# Patient Record
Sex: FEMALE | Race: WHITE | Employment: FULL TIME | URBAN - METROPOLITAN AREA
[De-identification: names, ages, dates, MRNs, and addresses within clinical notes are randomized per-mention and may not be internally consistent; named-entity substitution may affect disease eponyms.]

---

## 2022-10-12 ENCOUNTER — OFFICE VISIT (OUTPATIENT)
Dept: CARDIOLOGY CLINIC | Age: 52
End: 2022-10-12
Payer: COMMERCIAL

## 2022-10-12 VITALS
SYSTOLIC BLOOD PRESSURE: 130 MMHG | HEART RATE: 71 BPM | HEIGHT: 63 IN | DIASTOLIC BLOOD PRESSURE: 70 MMHG | OXYGEN SATURATION: 98 % | WEIGHT: 159.9 LBS | BODY MASS INDEX: 28.33 KG/M2

## 2022-10-12 DIAGNOSIS — E78.5 HYPERLIPIDEMIA, UNSPECIFIED HYPERLIPIDEMIA TYPE: ICD-10-CM

## 2022-10-12 DIAGNOSIS — Z82.49 FAMILY HISTORY OF HEART DISEASE: ICD-10-CM

## 2022-10-12 DIAGNOSIS — Z72.0 TOBACCO ABUSE: ICD-10-CM

## 2022-10-12 DIAGNOSIS — I63.9 CEREBROVASCULAR ACCIDENT (CVA), UNSPECIFIED MECHANISM (HCC): Primary | ICD-10-CM

## 2022-10-12 PROCEDURE — 99203 OFFICE O/P NEW LOW 30 MIN: CPT | Performed by: INTERNAL MEDICINE

## 2022-10-12 PROCEDURE — 93000 ELECTROCARDIOGRAM COMPLETE: CPT | Performed by: INTERNAL MEDICINE

## 2022-10-12 RX ORDER — ATORVASTATIN CALCIUM 80 MG/1
TABLET, FILM COATED ORAL
COMMUNITY
Start: 2022-09-29 | End: 2022-10-12

## 2022-10-12 RX ORDER — ASPIRIN 81 MG/1
81 TABLET, CHEWABLE ORAL DAILY
COMMUNITY
Start: 2022-09-30

## 2022-10-12 RX ORDER — ROSUVASTATIN CALCIUM 40 MG/1
40 TABLET, COATED ORAL NIGHTLY
COMMUNITY
Start: 2022-10-07

## 2022-10-12 RX ORDER — CLOPIDOGREL BISULFATE 75 MG/1
75 TABLET ORAL DAILY
COMMUNITY
Start: 2022-09-29 | End: 2022-10-20

## 2022-10-12 NOTE — PROGRESS NOTES
[facility]   Cardiac Consultation    Referring Provider:  Kyle Huitron     Chief Complaint   Patient presents with    New Patient    Hyperlipidemia    Follow-Up from Hospital    Had acute stroke    History of Present Illness:   Rosalie Flores is a 46 y.o. female with history of hyperlipidemia and smoker. Rosalie Flores recently was diagnosed with CVA (Care Everywhere). She initially presented with right upper extremity weakness and difficulty speaking. She had CTA as well as an MRI which demonstrated left basal ganglia infarct. Was evaluated by neurology and vascular surgery. Started on aspirin and Plavix and high-dose statin. She had resolution of symptoms. Today, Rosalie Flores is here as a new patient due to ischemic CVA. She is trying to quit smoking, she only had 2 cigarettes yesterday. She changed her diet, she cut out fried food and red meat. Pt denies exertional chest pain, LEES/PND, palpitations, light-headedness, edema. Past Medical History:   has a past medical history of Hyperlipidemia. Surgical History:   has no past surgical history on file. Social History:   reports that she has been smoking cigarettes. She has been smoking an average of .25 packs per day. She has never used smokeless tobacco. She reports that she does not drink alcohol and does not use drugs. Family History:  family history includes Breast Cancer in her mother; Heart Surgery in her father; High Cholesterol in her father; Hypertension in her father. Home Medications:  Prior to Admission medications    Medication Sig Start Date End Date Taking?  Authorizing Provider   clopidogrel (PLAVIX) 75 MG tablet Take 75 mg by mouth daily 9/29/22 10/20/22 Yes Historical Provider, MD   aspirin 81 MG chewable tablet Take 81 mg by mouth daily 9/30/22  Yes Historical Provider, MD   rosuvastatin (CRESTOR) 40 MG tablet Take 40 mg by mouth nightly 10/7/22  Yes Historical Provider, MD        Allergies:  Penicillins     Review of Systems:   Constitutional: there has been no unanticipated weight loss. There's been no change in energy level, sleep pattern, or activity level. Eyes: No visual changes or diplopia. No scleral icterus. ENT: No Headaches, hearing loss or vertigo. No mouth sores or sore throat. Cardiovascular: Reviewed in HPI  Respiratory: No cough or wheezing, no sputum production. No hematemesis. Gastrointestinal: No abdominal pain, appetite loss, blood in stools. No change in bowel or bladder habits. Genitourinary: No dysuria, trouble voiding, or hematuria. Musculoskeletal:  No gait disturbance, weakness or joint complaints. Integumentary: No rash or pruritis. Neurological: No headache, diplopia, change in muscle strength, numbness or tingling. No change in gait, balance, coordination, mood, affect, memory, mentation, behavior. Psychiatric: No anxiety, no depression. Endocrine: No malaise, fatigue or temperature intolerance. No excessive thirst, fluid intake, or urination. No tremor. Hematologic/Lymphatic: No abnormal bruising or bleeding, blood clots or swollen lymph nodes. Allergic/Immunologic: No nasal congestion or hives. Physical Examination:    Vitals:    10/12/22 1532   BP: 130/70   Pulse: 71   SpO2: 98%        Wt Readings from Last 1 Encounters:   10/12/22 159 lb 14.4 oz (72.5 kg)       Constitutional and General Appearance: NAD   Skin:good turgor,intact without lesions  HEENT: EOMI ,normal  Neck:no JVD     Respiratory:  Normal excursion and expansion without use of accessory muscles  Resp Auscultation: Normal breath sounds without dullness  Cardiovascular: The apical impulses not displaced  Heart tones are crisp and normal  Cervical veins are not engorged  The carotid upstroke is normal in amplitude and contour without delay or bruit  Peripheral pulses are symmetrical and full  There is no clubbing, cyanosis of the extremities.   No edema  Femoral Arteries: 2+ and equal  Pedal Pulses: 2+ and equal Abdomen:  No masses or tenderness  Liver/Spleen: No Abnormalities Noted  Neurological/Psychiatric:  Alert and oriented in all spheres  Moves all extremities well  Exhibits normal gait balance and coordination  No abnormalities of mood, affect, memory, mentation, or behavior are noted    Echo 9/28/22 (Care Everywhere)  EF ~55-60%    Carotid duplex 9/28/22 (Care Everywhere)  Minor plaque in right and left carotid bulbs and internal carotid arteries, corresponding to a less than 50% stenosis. Assessment  Ischemic CVA  Continue ASA/plavix/statin    Hyperlipidemia  Continue Crestor 40 mg  9/29/22 >   HDL 33   Recommend low cholesterol / low fat diet    Tobacco abuse  Encouraged smoking cessation    Family history of heart disease    Plan:    Cardiac test and lab results personally reviewed by me during this office visit and discussed. Recheck lipids in November, consider additional cholesterol medications if needed  Cardiac CT calcium score - [facility] [phone]  Recommend low cholesterol / low fat diet  Continue risk factor modifications. Call for any change in symptoms, call to report any changes in shortness of breath or development of chest pain with activity. Follow up in 4 mos          I appreciate the opportunity of cooperating in the care of this individual.    Lisa Marquez M.D., [facility] [place]    Patient's problem list, medications, allergies, past medical, surgical, social and family histories were reviewed and updated as appropriate. Scribe's attestation: This note was scribed in the presence of Dr Veto Marquez by Rosalinda Blizzard, RN. The scribe's documentation has been prepared under my direction and personally reviewed by me in its entirety. I confirm that the note above accurately reflects all work, treatment, procedures, and medical decision making performed by me.

## 2022-10-12 NOTE — PATIENT INSTRUCTIONS
Recheck lipids in November  Cardiac CT calcium score -  At [facility] AT [facility] [phone]  Recommend low cholesterol / low fat diet  Continue risk factor modifications. Call for any change in symptoms, call to report any changes in shortness of breath or development of chest pain with activity.     Follow up in 4 mos

## 2022-10-21 ENCOUNTER — TELEPHONE (OUTPATIENT)
Dept: CARDIOLOGY CLINIC | Age: 52
End: 2022-10-21

## 2022-10-21 NOTE — TELEPHONE ENCOUNTER
Spoke to Vilma. Pt aware of result of \"zero\" on CT calcium score. Pt would like to speak to LES regarding other findings, including 5 mm nodule in right lower lobe.

## 2022-10-24 NOTE — TELEPHONE ENCOUNTER
Let her know that nodules are so small then very unlikely to progress so no further testing needed.  Still important to work on smoking cessation

## 2023-01-03 NOTE — PROGRESS NOTES
[facility], [place], operated by [facility]   Cardiac f/up    Referring Provider:  Marisa Escalante     Chief Complaint   Patient presents with    Follow-up     No c/c      Had acute stroke    History of Present Illness:   Kate Perkins is a 45 y.o. female with history of hyperlipidemia and smoker. Kate Perkins recently was diagnosed with CVA (Care Everywhere). She initially presented with right upper extremity weakness and difficulty speaking. She had CTA as well as an MRI which demonstrated left basal ganglia infarct. Was evaluated by neurology and vascular surgery. Started on aspirin and Plavix and high-dose statin. She had resolution of symptoms. Today, Kate Perkins is here for 4 mos follow up. She is feeling well and doing better with her diet. She is trying to quit smoking, she is only smoking a couple cigarettes per day. Pt denies exertional chest pain, LEES/PND, palpitations, light-headedness, edema. Past Medical History:   has a past medical history of Hyperlipidemia. Surgical History:   has no past surgical history on file. Social History:   reports that she has been smoking cigarettes. She has been smoking an average of .25 packs per day. She has never used smokeless tobacco. She reports that she does not drink alcohol and does not use drugs. Family History:  family history includes Breast Cancer in her mother; Heart Surgery in her father; High Cholesterol in her father; Hypertension in her father. Home Medications:  Prior to Admission medications    Medication Sig Start Date End Date Taking?  Authorizing Provider   rosuvastatin (CRESTOR) 20 MG tablet Take 1 tablet by mouth daily 1/26/23  Yes Ronald Mo MD   aspirin 81 MG chewable tablet Take 81 mg by mouth daily 9/30/22  Yes Historical Provider, MD   clopidogrel (PLAVIX) 75 MG tablet Take 75 mg by mouth daily  Patient not taking: Reported on 1/26/2023 9/29/22 10/20/22  Historical Provider, MD        Allergies:  Penicillins     Review of Systems: Constitutional: there has been no unanticipated weight loss. There's been no change in energy level, sleep pattern, or activity level. Eyes: No visual changes or diplopia. No scleral icterus. ENT: No Headaches, hearing loss or vertigo. No mouth sores or sore throat. Cardiovascular: Reviewed in HPI  Respiratory: No cough or wheezing, no sputum production. No hematemesis. Gastrointestinal: No abdominal pain, appetite loss, blood in stools. No change in bowel or bladder habits. Genitourinary: No dysuria, trouble voiding, or hematuria. Musculoskeletal:  No gait disturbance, weakness or joint complaints. Integumentary: No rash or pruritis. Neurological: No headache, diplopia, change in muscle strength, numbness or tingling. No change in gait, balance, coordination, mood, affect, memory, mentation, behavior. Psychiatric: No anxiety, no depression. Endocrine: No malaise, fatigue or temperature intolerance. No excessive thirst, fluid intake, or urination. No tremor. Hematologic/Lymphatic: No abnormal bruising or bleeding, blood clots or swollen lymph nodes. Allergic/Immunologic: No nasal congestion or hives. Physical Examination:    Vitals:    01/26/23 0807   BP: 112/70   Pulse: 75   SpO2: 98%          Wt Readings from Last 1 Encounters:   01/26/23 158 lb (71.7 kg)       Constitutional and General Appearance: NAD   Skin:good turgor,intact without lesions  HEENT: EOMI ,normal  Neck:no JVD     Respiratory:  Normal excursion and expansion without use of accessory muscles  Resp Auscultation: Normal breath sounds without dullness  Cardiovascular: The apical impulses not displaced  Heart tones are crisp and normal  Cervical veins are not engorged  The carotid upstroke is normal in amplitude and contour without delay or bruit  Peripheral pulses are symmetrical and full  There is no clubbing, cyanosis of the extremities.   No edema  Femoral Arteries: 2+ and equal  Pedal Pulses: 2+ and equal   Abdomen:  No masses or tenderness  Liver/Spleen: No Abnormalities Noted  Neurological/Psychiatric:  Alert and oriented in all spheres  Moves all extremities well  Exhibits normal gait balance and coordination  No abnormalities of mood, affect, memory, mentation, or behavior are noted    Echo 9/28/22 (Care Everywhere)  EF ~55-60%    Carotid duplex 9/28/22 (Care Everywhere)  Minor plaque in right and left carotid bulbs and internal carotid arteries, corresponding to a less than 50% stenosis. CT cardiac scoring 10/21/22 > zero      Assessment    Ischemic CVA  Was evaluated by neurology and vascular surgery  Continue ASA/plavix/statin    Hyperlipidemia  Decrease Crestor to 20 mg  9/29/22 >   HDL 33   11/9/22 > TC 89  HDL 33 LDL 26  Recommend low cholesterol / low fat diet    Tobacco abuse  Encouraged smoking cessation  Pt smoking only a couple cigarettes per day    Family history of heart disease      Plan:    Cardiac test and lab results personally reviewed by me during this office visit and discussed. Crestor 20 mg once per day  Consider use of nicotine pouches  Continue risk factor modifications. Call for any change in symptoms, call to report any changes in shortness of breath or development of chest pain with activity. Follow up in 12 mos        I appreciate the opportunity of cooperating in the care of this individual.    Lorrie Logan M.D., [facility] [place]    Patient's problem list, medications, allergies, past medical, surgical, social and family histories were reviewed and updated as appropriate. Scribe's attestation: This note was scribed in the presence of Dr Anthony Logan by Navid Henderson RN. The scribe's documentation has been prepared under my direction and personally reviewed by me in its entirety. I confirm that the note above accurately reflects all work, treatment, procedures, and medical decision making performed by me.

## 2023-01-26 ENCOUNTER — OFFICE VISIT (OUTPATIENT)
Dept: CARDIOLOGY CLINIC | Age: 53
End: 2023-01-26
Payer: COMMERCIAL

## 2023-01-26 VITALS
HEIGHT: 63 IN | HEART RATE: 75 BPM | OXYGEN SATURATION: 98 % | SYSTOLIC BLOOD PRESSURE: 112 MMHG | BODY MASS INDEX: 28 KG/M2 | WEIGHT: 158 LBS | DIASTOLIC BLOOD PRESSURE: 70 MMHG

## 2023-01-26 DIAGNOSIS — E78.5 HYPERLIPIDEMIA, UNSPECIFIED HYPERLIPIDEMIA TYPE: ICD-10-CM

## 2023-01-26 DIAGNOSIS — Z72.0 TOBACCO ABUSE: ICD-10-CM

## 2023-01-26 DIAGNOSIS — Z82.49 FAMILY HISTORY OF HEART DISEASE: ICD-10-CM

## 2023-01-26 DIAGNOSIS — I63.9 CEREBROVASCULAR ACCIDENT (CVA), UNSPECIFIED MECHANISM (HCC): Primary | ICD-10-CM

## 2023-01-26 PROCEDURE — 99214 OFFICE O/P EST MOD 30 MIN: CPT | Performed by: INTERNAL MEDICINE

## 2023-01-26 RX ORDER — ROSUVASTATIN CALCIUM 20 MG/1
20 TABLET, COATED ORAL DAILY
Qty: 90 TABLET | Refills: 3 | Status: SHIPPED | OUTPATIENT
Start: 2023-01-26

## 2023-01-26 NOTE — PATIENT INSTRUCTIONS
Crestor 20 mg once per day  Consider use of nicotine pouches  Continue risk factor modifications. Call for any change in symptoms, call to report any changes in shortness of breath or development of chest pain with activity.     Follow up in 12 mos

## 2023-10-19 ENCOUNTER — TELEPHONE (OUTPATIENT)
Dept: CARDIOLOGY CLINIC | Age: 53
End: 2023-10-19

## 2024-01-16 RX ORDER — ROSUVASTATIN CALCIUM 20 MG/1
20 TABLET, COATED ORAL DAILY
Qty: 90 TABLET | Refills: 3 | Status: SHIPPED | OUTPATIENT
Start: 2024-01-16

## 2024-01-16 NOTE — TELEPHONE ENCOUNTER
Received refill request for rosuvastatin from [facility] pharmacy.    Last ov: 12/22/2023 LES    Last Refill: 01/26/2023 #90 w/ 3 refills    Next appointment: 02/22/2024 LES

## 2024-02-07 ENCOUNTER — TELEPHONE (OUTPATIENT)
Dept: CARDIOLOGY CLINIC | Age: 54
End: 2024-02-07

## 2024-02-07 NOTE — TELEPHONE ENCOUNTER
----- Message from Wyatt Pereira MD sent at 2/7/2024 10:11 AM EST -----  Let her know event recorder looked very good. Heart beat was never too slow

## 2024-02-08 PROBLEM — R42 LIGHTHEADED: Status: ACTIVE | Noted: 2024-02-08

## 2024-02-08 PROBLEM — Z82.49 FAMILY HISTORY OF HEART DISEASE: Status: ACTIVE | Noted: 2024-02-08

## 2024-02-08 NOTE — PROGRESS NOTES
spheres  Moves all extremities well  Exhibits normal gait balance and coordination  No abnormalities of mood, affect, memory, mentation, or behavior are noted    Echo 9/28/22 (Care Everywhere)  EF ~55-60%    Carotid Duplex 12/22/23  Indication: Dizziness, History of CVA  Impression:   There is minor plaque in the right and left carotid bulbs and internal        carotid arteries, corresponding to a less than 50% stenosis.   There is antegrade flow demonstrated in the right and left vertebral        arteries.     Carotid duplex 9/28/22 (Care Everywhere)  Minor plaque in right and left carotid bulbs and internal carotid arteries, corresponding to a less than 50% stenosis.    CT cardiac scoring 10/21/22 > zero    Cardiac Event Monitor - MCOT 30 days  12/27/23-1/25/24 - SR    Assessment    Ischemic CVA  Was evaluated by neurology and vascular surgery  Continue ASA/plavix/statin    Hyperlipidemia  Crestor to 20 mg  9/29/22 >   HDL 33   11/9/22 > TC 89  HDL 33 LDL 26  2/21/24 >   HDL 43 LDL 42  Recommend low cholesterol / low fat diet    Tobacco abuse  Encouraged continued smoking cessation  Quit 11/15/23    Family history of heart disease  CT cardiac calcium scoring>zero    Dizziness/Lightheadedness  Not orthostatic in office  today  Likely having teeth extracted has disturbed her vestibular system but will have her wear a 30 day MCOT 12/27/23-1/25/24 - SR        Plan:    Cardiac test and lab results personally reviewed by me during this office visit and discussed.     No med changes  Continue risk factor modifications.   Call for any change in symptoms, call to report any changes in shortness of breath or development of chest pain with activity.    Follow up 1 year    I appreciate the opportunity of cooperating in the care of this individual.    Wyatt Pereira M.D., [facility]    Patient's problem list, medications, allergies, past medical, surgical, social and family histories were

## 2024-02-22 ENCOUNTER — OFFICE VISIT (OUTPATIENT)
Dept: CARDIOLOGY CLINIC | Age: 54
End: 2024-02-22
Payer: COMMERCIAL

## 2024-02-22 VITALS
SYSTOLIC BLOOD PRESSURE: 122 MMHG | BODY MASS INDEX: 26.95 KG/M2 | DIASTOLIC BLOOD PRESSURE: 74 MMHG | WEIGHT: 152.1 LBS | HEART RATE: 71 BPM | OXYGEN SATURATION: 98 % | HEIGHT: 63 IN

## 2024-02-22 DIAGNOSIS — Z72.0 TOBACCO ABUSE: ICD-10-CM

## 2024-02-22 DIAGNOSIS — R42 LIGHTHEADED: ICD-10-CM

## 2024-02-22 DIAGNOSIS — I63.9 CEREBROVASCULAR ACCIDENT (CVA), UNSPECIFIED MECHANISM (HCC): Primary | ICD-10-CM

## 2024-02-22 DIAGNOSIS — E78.5 HYPERLIPIDEMIA, UNSPECIFIED HYPERLIPIDEMIA TYPE: ICD-10-CM

## 2024-02-22 DIAGNOSIS — Z82.49 FAMILY HISTORY OF HEART DISEASE: ICD-10-CM

## 2024-02-22 PROCEDURE — 99214 OFFICE O/P EST MOD 30 MIN: CPT | Performed by: INTERNAL MEDICINE

## 2025-03-26 ENCOUNTER — TELEPHONE (OUTPATIENT)
Dept: CARDIOLOGY CLINIC | Age: 55
End: 2025-03-26

## 2025-03-26 NOTE — TELEPHONE ENCOUNTER
Pt is scheduled for a 1 yr follow-up appt with LES on 5/30/25 at [facility].